# Patient Record
Sex: FEMALE | Race: BLACK OR AFRICAN AMERICAN | Employment: FULL TIME | URBAN - METROPOLITAN AREA
[De-identification: names, ages, dates, MRNs, and addresses within clinical notes are randomized per-mention and may not be internally consistent; named-entity substitution may affect disease eponyms.]

---

## 2019-05-25 ENCOUNTER — APPOINTMENT (OUTPATIENT)
Dept: GENERAL RADIOLOGY | Age: 49
End: 2019-05-25
Attending: PHYSICIAN ASSISTANT
Payer: COMMERCIAL

## 2019-05-25 ENCOUNTER — HOSPITAL ENCOUNTER (EMERGENCY)
Age: 49
Discharge: HOME | End: 2019-05-25
Attending: EMERGENCY MEDICINE
Payer: COMMERCIAL

## 2019-05-25 VITALS
HEIGHT: 65 IN | RESPIRATION RATE: 16 BRPM | HEART RATE: 74 BPM | SYSTOLIC BLOOD PRESSURE: 110 MMHG | TEMPERATURE: 98.5 F | WEIGHT: 175 LBS | OXYGEN SATURATION: 98 % | DIASTOLIC BLOOD PRESSURE: 70 MMHG | BODY MASS INDEX: 29.16 KG/M2

## 2019-05-25 DIAGNOSIS — S60.419A ABRASION OF FINGER OF LEFT HAND, INITIAL ENCOUNTER: ICD-10-CM

## 2019-05-25 DIAGNOSIS — S62.617A CLOSED DISPLACED FRACTURE OF PROXIMAL PHALANX OF LEFT LITTLE FINGER, INITIAL ENCOUNTER: Primary | ICD-10-CM

## 2019-05-25 PROCEDURE — 99283 EMERGENCY DEPT VISIT LOW MDM: CPT

## 2019-05-25 PROCEDURE — 74011000250 HC RX REV CODE- 250: Performed by: PHYSICIAN ASSISTANT

## 2019-05-25 PROCEDURE — 74011250637 HC RX REV CODE- 250/637: Performed by: PHYSICIAN ASSISTANT

## 2019-05-25 PROCEDURE — 73130 X-RAY EXAM OF HAND: CPT

## 2019-05-25 PROCEDURE — 75810000303 HC CLSD TRMT  FRACTURE/DISLOCATION W/  ANES

## 2019-05-25 RX ORDER — OXYCODONE AND ACETAMINOPHEN 5; 325 MG/1; MG/1
1 TABLET ORAL
Qty: 20 TAB | Refills: 0 | Status: SHIPPED | OUTPATIENT
Start: 2019-05-25 | End: 2019-06-01

## 2019-05-25 RX ORDER — BUPIVACAINE HYDROCHLORIDE 5 MG/ML
5 INJECTION, SOLUTION EPIDURAL; INTRACAUDAL
Status: COMPLETED | OUTPATIENT
Start: 2019-05-25 | End: 2019-05-25

## 2019-05-25 RX ORDER — OXYCODONE AND ACETAMINOPHEN 5; 325 MG/1; MG/1
1 TABLET ORAL
Status: COMPLETED | OUTPATIENT
Start: 2019-05-25 | End: 2019-05-25

## 2019-05-25 RX ORDER — OXYCODONE AND ACETAMINOPHEN 5; 325 MG/1; MG/1
1 TABLET ORAL
Qty: 12 TAB | Refills: 0 | Status: SHIPPED | OUTPATIENT
Start: 2019-05-25 | End: 2019-05-25

## 2019-05-25 RX ADMIN — OXYCODONE HYDROCHLORIDE AND ACETAMINOPHEN 1 TABLET: 5; 325 TABLET ORAL at 13:40

## 2019-05-25 RX ADMIN — OXYCODONE HYDROCHLORIDE AND ACETAMINOPHEN 1 TABLET: 5; 325 TABLET ORAL at 11:17

## 2019-05-25 RX ADMIN — BUPIVACAINE HYDROCHLORIDE 25 MG: 5 INJECTION, SOLUTION EPIDURAL; INTRACAUDAL; PERINEURAL at 11:17

## 2019-05-25 NOTE — ED TRIAGE NOTES
Pt arrives ambulatory to ED alone with c/o left 5th digit finger injury with pain and deformity after tripping over crack in sidewalk today.

## 2019-05-25 NOTE — ED PROVIDER NOTES
Marie Higgisn is a 54 y.o. Female, R hand dominant, who presents ambulatory to ER with c/o L 5th finger deformity and pain x fall PTA. Pt states she was walking on the sidewalk when her show got caught on a part of the sidewalk that was sticking up farther than the others causing her to trip and fall. States she fell and tried to protect her head by putting her hands out and states majority of her weight came down on her left hand causing acute onset pain and deformity to L 5th finger. Denies hitting her head. Also notes some abrasions to her L 2nd, 3rd, and 4th fingers. Td UTD. Denies any other complaints. Denies taking anything for sx. PCP: Gabriel Hunt MD   PMHx significant for: Past Medical History:  No date: Fluid retention      Comment:  Takes daily but initially prescribed for fluid retention  No date: Occipital neuralgia    PSHx significant for: History reviewed. No pertinent surgical history. -- Codeine -- Hives   -- Indomethacin -- Palpitations   -- Naproxen Sodium -- Other (comments)    --  \"feel bad all over\"   -- Sulfa (Sulfonamide Antibiotics) -- Unknown (comments)    There are no other complaints, changes or physical findings at this time. Past Medical History:   Diagnosis Date    Fluid retention     Takes daily but initially prescribed for fluid retention    Occipital neuralgia        History reviewed. No pertinent surgical history.       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     Employer: OTHER     Comment: [employer]   Social Needs    Financial resource strain: Not on file    Food insecurity:     Worry: Not on file     Inability: Not on file    Transportation needs:     Medical: Not on file     Non-medical: Not on file   Tobacco Use    Smoking status: Former Smoker     Packs/day: 0.25     Years: 5.00     Pack years: 1.25     Types: Cigarettes    Smokeless tobacco: Never Used   Substance and Sexual Activity    Alcohol use: No     Alcohol/week: 0.0 oz    Drug use: No    Sexual activity: Never   Lifestyle    Physical activity:     Days per week: Not on file     Minutes per session: Not on file    Stress: Not on file   Relationships    Social connections:     Talks on phone: Not on file     Gets together: Not on file     Attends Taoism service: Not on file     Active member of club or organization: Not on file     Attends meetings of clubs or organizations: Not on file     Relationship status: Not on file    Intimate partner violence:     Fear of current or ex partner: Not on file     Emotionally abused: Not on file     Physically abused: Not on file     Forced sexual activity: Not on file   Other Topics Concern    Not on file   Social History Narrative    Lives in [place], [place] - returns home every weekend. 11 yo son. ALLERGIES: Codeine; Indomethacin; Naproxen sodium; and Sulfa (sulfonamide antibiotics)    Review of Systems   Constitutional: Negative. HENT: Negative. Eyes: Negative. Respiratory: Negative. Cardiovascular: Negative. Gastrointestinal: Negative. Genitourinary: Negative. Musculoskeletal: Negative for back pain and neck pain. L 5th finger deformity and pain   Skin: Positive for wound. Multiple superficial abrasions to L hand   Neurological: Negative. Negative for dizziness, seizures, syncope and headaches. Hematological: Negative. Psychiatric/Behavioral: Negative. All other systems reviewed and are negative.       Vitals:    05/25/19 1036 05/25/19 1055   BP:  113/77   Pulse: 98 77   Resp:  16   Temp:  98.4 °F (36.9 °C)   SpO2: 98% 98% Weight:  79.4 kg (175 lb)   Height:  5' 5\" (1.651 m)            Physical Exam   Constitutional: She is oriented to person, place, and time. She appears well-developed and well-nourished. No distress. HENT:   Head: Normocephalic and atraumatic. Eyes: Conjunctivae are normal.   Neck: Normal range of motion. Cardiovascular: Intact distal pulses and normal pulses. Musculoskeletal: Normal range of motion. She exhibits no edema or tenderness. Deformity to L 5th finger at MCP joint with associated TTP and limited ROM all joints L 5th finger 2/2 pain and deformity. NVI distally, brisk cap refill. No further bony tenderness to L hand/fingers. FROM all remaining joints L hand and LUE without difficulty. NVI distally, brisk cap refill all digits. Ambulatory in ED without difficulty   Neurological: She is alert and oriented to person, place, and time. She has normal strength. No sensory deficit. Skin: Skin is warm and dry. No rash noted. She is not diaphoretic. No erythema. No pallor. approx 1cm superficial abrasion to L 3rd and 4th fingers to palmar aspect overlying PIP joints, bleeding controlled, no associated edema or underlying bony tenderness appreciable. approx 1/2cm linear superficial abrasion to L 2nd finger to palmar aspect overlying PIP joint. Bleeding controlled. No appreciably underlying bony tenderness. Psychiatric: She has a normal mood and affect. Her behavior is normal.   Nursing note and vitals reviewed. MDM  Number of Diagnoses or Management Options  Abrasion of finger of left hand, initial encounter:   Closed displaced fracture of proximal phalanx of left little finger, initial encounter:   Diagnosis management comments: Janice Shane is a 54 y.o. Female, R hand dominant, who presents ambulatory to ER with c/o L 5th finger deformity and pain x fall PTA. Pt with deformity to L 5th finger at MCP joint with associated limited ROM to all joints L 5th finger. NVI distally.  Xray shows acute fx of proximal 5th phalanx with angulation and displacement. No dislocation. Discussed with orthopedics who recommended reducing the fx as best as possible in ED and to have pt f/u next week with Dr. Юлия Dominguez next week as pt will need surgery. I blocked pts L 5th finger and reduced the fx best as possible and placed in ulnar gutter splint. Interval improvement in displacement, still significantly angulated. Cleansed abrasions. Discharged home with orthopedic f/u and pain medicine prn. Strict return precautions discussed. Gina Grigsby PA-C         Amount and/or Complexity of Data Reviewed  Tests in the radiology section of CPT®: ordered and reviewed  Discuss the patient with other providers: yes (Dr. Black Perry; Nancy St PA-C)    Patient Progress  Patient progress: stable         Procedures                 Procedure Note - Digital Block:   11:30 AM  Performed by: Gina Grigsby PA-C  Bupivacaine 0.25% without epinephrine used to perform digital block of Left small finger(s). The procedure took 1-15 minutes, and pt tolerated well. Procedure Note - Reduction:    11:40M  Performed by Gina Grigsby PA-C . Procedure start time: 11:45AM  Procedure end time: 12:00PM  Procedure was performed with a block. Medications provided as below:  Medications   oxyCODONE-acetaminophen (PERCOCET) 5-325 mg per tablet 1 Tab (has no administration in time range)   oxyCODONE-acetaminophen (PERCOCET) 5-325 mg per tablet 1 Tab (1 Tab Oral Given 5/25/19 1117)   bupivacaine (PF) (MARCAINE) 0.5 % (5 mg/mL) injection 25 mg (25 mg Peripheral Nerve Block Given by Provider 5/25/19 1117)       Immediately prior to the procedure, the patient was reevaluated and found suitable for the planned procedure and any planned medications. Immediately prior to the procedure a time out was called to verify the correct patient, procedure, equipment, staff, and marking as appropriate.     Prior to the procedure, neurovascular exam was intact. Analgesia was obtained with oral analgesics and digital nerve block. To achieve reduction of the patient's left 5th finger fx , longitudinal traction manipulation was utilized. The fx had improved alignment post reduction. Neurovascular exam was intact following the procedure. Post reduction x-ray ordered. The procedure took 1-15 minutes, and pt tolerated well. 1:38 PM  Pt has been reevaluated. There are no new complaints, changes, or physical findings at this time. Medications have been reviewed w/ pt and/or family. Pt and/or family's questions have been answered. Pt and/or family expressed good understanding of the dx/tx/rx and is in agreement with plan of care. Pt instructed and agreed to f/u w/ ortho and to return to ED upon further deterioration. Pt is ready for discharge. LABORATORY TESTS:  No results found for this or any previous visit (from the past 12 hour(s)). IMAGING RESULTS:  XR HAND LT MIN 3 V   Final Result   Status post closed reduction of left fifth finger proximal   phalangeal fracture with improved ulnar angulation, persistent dorsal angulation   and minimal displacement. .      XR HAND LT MIN 3 V   Final Result   Acute left small finger proximal phalangeal fracture with minimal   displacement and significant angulation. Mojgan Lockhart Hand Lt Min 3 V    Result Date: 5/25/2019  EXAM: XR HAND LT MIN 3 V INDICATION: post reduction fracture. COMPARISON: None. FINDINGS: Three views of the left hand demonstrate closed reduction of left small finger proximal phalangeal fracture with persistent volar angulation but significant improvement in ulnar angulation. There is persistent stable 2 mm radial displacement of the distal fracture fragment. . Soft tissue swelling is diffuse. .     Status post closed reduction of left fifth finger proximal phalangeal fracture with improved ulnar angulation, persistent dorsal angulation and minimal displacement. Christie Doshi Hand Lt Min 3 V    Result Date: 2019  EXAM: XR HAND LT MIN 3 V INDICATION: L hand pain, 5th finger dislocation. COMPARISON: None. FINDINGS: Three views of the left hand demonstrate left small finger proximal phalangeal fracture at the proximal metaphysis, with ulnar and dorsal angulation of the distal fracture fragments and approximately 2.4 mm radial displacement. . The soft tissues are within normal limits. Acute left small finger proximal phalangeal fracture with minimal displacement and significant angulation. Vijay Luo MEDICATIONS GIVEN:  Medications   oxyCODONE-acetaminophen (PERCOCET) 5-325 mg per tablet 1 Tab (has no administration in time range)   oxyCODONE-acetaminophen (PERCOCET) 5-325 mg per tablet 1 Tab (1 Tab Oral Given 19 1117)   bupivacaine (PF) (MARCAINE) 0.5 % (5 mg/mL) injection 25 mg (25 mg Peripheral Nerve Block Given by Provider 19 1117)       IMPRESSION:  1. Closed displaced fracture of proximal phalanx of left little finger, initial encounter    2. Abrasion of finger of left hand, initial encounter        PLAN:  1. Current Discharge Medication List      START taking these medications    Details   oxyCODONE-acetaminophen (PERCOCET) 5-325 mg per tablet Take 1 Tab by mouth every six (6) hours as needed for Pain for up to 7 days. Max Daily Amount: 4 Tabs. Qty: 12 Tab, Refills: 0    Associated Diagnoses: Closed displaced fracture of proximal phalanx of left little finger, initial encounter         CONTINUE these medications which have NOT CHANGED    Details   gabapentin (NEURONTIN) 300 mg capsule       DULoxetine (CYMBALTA) 30 mg capsule Take 1 Cap by mouth daily. Qty: 30 Cap, Refills: 3    Associated Diagnoses: Occipital neuralgia      hydrochlorothiazide (HYDRODIURIL) 25 mg tablet Take 25 mg by mouth as needed.          STOP taking these medications       traMADol (ULTRAM) 50 mg tablet Comments:   Reason for Stoppin.   Follow-up Information     Follow up With Specialties Details Why Contact Info    Abel Bojorquez MD Orthopedic Surgery Call in 3 days call and schedule next available follow up [address]  [phone]      [address] [facility] [facility] Emergency Medicine  If symptoms worsen [address] [place] [address]  [phone]          Return to ED if worse

## 2019-05-25 NOTE — DISCHARGE INSTRUCTIONS
We hope that we have addressed all of your medical concerns. The examination and treatment you received in the Emergency Department were for an emergent problem and were not intended as complete care. It is important that you follow up with your healthcare provider(s) for ongoing care. If your symptoms worsen or do not improve as expected, and you are unable to reach your usual health care provider(s), you should return to the Emergency Department. Today's healthcare is undergoing tremendous change, and patient satisfaction surveys are one of the many tools to assess the quality of medical care. You may receive a survey from the [employer] organization regarding your experience in the Emergency Department. I hope that your experience has been completely positive, particularly the medical care that I provided. As such, please participate in the survey; anything less than excellent does not meet my expectations or intentions. [facility] and [facility] participate in nationally recognized quality of care measures. If your blood pressure is greater than 120/80, as reported below, we urge that you seek medical care to address the potential of high blood pressure, commonly known as hypertension. Hypertension can be hereditary or can be caused by certain medical conditions, pain, stress, or \"white coat syndrome. \"       Please make an appointment with your health care provider(s) for follow up of your Emergency Department visit. VITALS:   Patient Vitals for the past 8 hrs:   Temp Pulse Resp BP SpO2   05/25/19 1055 98.4 °F (36.9 °C) 77 16 113/77 98 %   05/25/19 1036 -- 98 -- -- 98 %          Thank you for allowing us to provide you with medical care today. We realize that you have many choices for your emergency care needs. Please choose us in the future for any continued health care needs. [facility]: [phone]            No results found for this or any previous visit (from the past 24 hour(s)). Xr Hand Lt Min 3 V    Result Date: 5/25/2019  EXAM: XR HAND LT MIN 3 V INDICATION: post reduction fracture. COMPARISON: None. FINDINGS: Three views of the left hand demonstrate closed reduction of left small finger proximal phalangeal fracture with persistent volar angulation but significant improvement in ulnar angulation. There is persistent stable 2 mm radial displacement of the distal fracture fragment. . Soft tissue swelling is diffuse. .     Status post closed reduction of left fifth finger proximal phalangeal fracture with improved ulnar angulation, persistent dorsal angulation and minimal displacement. Armando Neigh Hand Lt Min 3 V    Result Date: 5/25/2019  EXAM: XR HAND LT MIN 3 V INDICATION: L hand pain, 5th finger dislocation. COMPARISON: None. FINDINGS: Three views of the left hand demonstrate left small finger proximal phalangeal fracture at the proximal metaphysis, with ulnar and dorsal angulation of the distal fracture fragments and approximately 2.4 mm radial displacement. . The soft tissues are within normal limits. Acute left small finger proximal phalangeal fracture with minimal displacement and significant angulation. .        Patient Education        Finger Fracture: Care Instructions  Your Care Instructions    Breaks in the bones of the finger usually heal well in about 3 to 4 weeks. The pain and swelling from a broken finger can last for weeks. But it should steadily improve, starting a few days after you break it. It is very important that you wear and take care of the cast or splint exactly as your doctor tells you to so that your finger heals properly and does not end up crooked. Wearing a splint may interfere with your normal activities. Ask for help with daily tasks if you need it. You heal best when you take good care of yourself.  Eat a variety of healthy foods, and don't smoke. Follow-up care is a key part of your treatment and safety. Be sure to make and go to all appointments, and call your doctor if you are having problems. It's also a good idea to know your test results and keep a list of the medicines you take. How can you care for yourself at home? · If your doctor put a splint on your finger, wear the splint exactly as directed. Do not remove it until your doctor says that you can. · Keep your hand raised above the level of your heart as much as you can. This will help reduce swelling. · Put ice or a cold pack on your finger for 10 to 20 minutes at a time. Try to do this every 1 to 2 hours for the next 3 days (when you are awake) or until the swelling goes down. Put a thin cloth between the ice and your skin. Keep the splint dry. · Be safe with medicines. Take pain medicines exactly as directed. ? If the doctor gave you a prescription medicine for pain, take it as prescribed. ? If you are not taking a prescription pain medicine, ask your doctor if you can take an over-the-counter medicine. When should you call for help? Call 911 anytime you think you may need emergency care. For example, call if:    · Your finger is cool or pale or changes color.    Call your doctor now or seek immediate medical care if:    · Your pain gets much worse.     · You have tingling, weakness, or numbness in your finger.     · You have signs of infection, such as:  ? Increased pain, swelling, warmth, or redness. ? Red streaks leading from the area. ? Pus draining from the area. ? Swollen lymph nodes in your neck, armpits, or groin. ? A fever.    Watch closely for changes in your health, and be sure to contact your doctor if:    · Your finger is not steadily improving. Where can you learn more? Go to http://calin-laura.info/. Enter Q071 in the search box to learn more about \"Finger Fracture: Care Instructions. \"  Current as of: September 20, 2018  Content Version: 11.9  © 4907-5489 [employer]. Care instructions adapted under license by [employer] (which disclaims liability or warranty for this information). If you have questions about a medical condition or this instruction, always ask your healthcare professional. [address] any warranty or liability for your use of this information.          Patient Education

## 2019-12-09 ENCOUNTER — HOSPITAL ENCOUNTER (EMERGENCY)
Age: 49
Discharge: HOME | End: 2019-12-09
Attending: EMERGENCY MEDICINE | Admitting: EMERGENCY MEDICINE
Payer: COMMERCIAL

## 2019-12-09 VITALS
TEMPERATURE: 98.5 F | HEIGHT: 65 IN | DIASTOLIC BLOOD PRESSURE: 80 MMHG | HEART RATE: 64 BPM | WEIGHT: 203.48 LBS | SYSTOLIC BLOOD PRESSURE: 133 MMHG | OXYGEN SATURATION: 99 % | BODY MASS INDEX: 33.9 KG/M2 | RESPIRATION RATE: 16 BRPM

## 2019-12-09 DIAGNOSIS — M54.81 OCCIPITAL NEURALGIA OF LEFT SIDE: Primary | ICD-10-CM

## 2019-12-09 PROCEDURE — 99282 EMERGENCY DEPT VISIT SF MDM: CPT

## 2019-12-09 RX ORDER — MULTIVIT WITH MINERALS/HERBS
1 TABLET ORAL DAILY
COMMUNITY

## 2019-12-09 RX ORDER — GABAPENTIN 300 MG/1
CAPSULE ORAL
Qty: 30 CAP | Refills: 1 | Status: SHIPPED | OUTPATIENT
Start: 2019-12-09 | End: 2021-06-05

## 2019-12-09 NOTE — DISCHARGE INSTRUCTIONS
Patient Education     Start the Gabapentin today for the nerve pain. Take 1 tablet the first day. Then take 1 tablet twice a day(every 12 hours)  the second day. Then take 1 tablet three times a day (every 8 hours) moving forward. Please call and arrange follow up with the neurologist.      Neuropathic Pain: Care Instructions  Your Care Instructions    Neuropathic pain is caused by pressure on or damage to your nerves. It's often simply called nerve pain. Some people feel this type of pain all the time. For others, it comes and goes. Diabetes, shingles, or an injury can cause nerve pain. Many people say the pain feels sharp, burning, or stabbing. But some people feel it as a dull ache. In some cases, it makes your skin very sensitive. So touch, pressure, and other sensations that did not hurt before may now cause pain. It's important to know that this kind of pain is real and can affect your quality of life. It's also important to know that treatment can help. Treatment includes pain medicines, exercise, and physical therapy. Medicines can help reduce the number of pain signals that travel over the nerves. This can make the painful areas less sensitive. It can also help you sleep better and improve your mood. But medicines are only one part of successful treatment. Most people do best with more than one kind of treatment. Your doctor may recommend that you try cognitive-behavioral therapy and stress management. Or, if needed, you may decide to try to quit smoking, lower your blood pressure, or better control blood sugar. These kinds of healthy changes can also make a difference. If you feel that your treatment is not working, talk to your doctor. And be sure to tell your doctor if you think you might be depressed or anxious. These are common problems that can also be treated. Follow-up care is a key part of your treatment and safety.  Be sure to make and go to all appointments, and call your doctor if you are having problems. It's also a good idea to know your test results and keep a list of the medicines you take. How can you care for yourself at home? · Be safe with medicines. Read and follow all instructions on the label. ? If the doctor gave you a prescription medicine for pain, take it as prescribed. ? If you are not taking a prescription pain medicine, ask your doctor if you can take an over-the-counter medicine. · Save hard tasks for days when you have less pain. Follow a hard task with an easy task. And remember to take breaks. · Relax, and reduce stress. You may want to try deep breathing or meditation. These can help. · Keep moving. Gentle, daily exercise can help reduce pain. Your doctor or physical therapist can tell you what type of exercise is best for you. This may include walking, swimming, and stationary biking. It may also include stretches and range-of-motion exercises. · Try heat, cold packs, and massage. · Get enough sleep. Constant pain can make you more tired. If the pain makes it hard to sleep, talk with your doctor. · Think positively. Your thoughts can affect your pain. Do fun things to distract yourself from the pain. See a movie, read a book, listen to music, or spend time with a friend. · Keep a pain diary. Try to write down how strong your pain is and what it feels like. Also try to notice and write down how your moods, thoughts, sleep, activities, and medicine affect your pain. These notes can help you and your doctor find the best ways to treat your pain. Reducing constipation caused by pain medicine  Pain medicines often cause constipation. To reduce constipation:  · Include fruits, vegetables, beans, and whole grains in your diet each day. These foods are high in fiber. · Drink plenty of fluids, enough so that your urine is light yellow or clear like water.  If you have kidney, heart, or liver disease and have to limit fluids, talk with your doctor before you increase the amount of fluids you drink. · Get some exercise every day. Build up slowly to 30 to 60 minutes a day on 5 or more days of the week. · Take a fiber supplement, such as Citrucel or Metamucil, every day if needed. Read and follow all instructions on the label. · Schedule time each day for a bowel movement. Having a daily routine may help. Take your time and do not strain when having a bowel movement. · Ask your doctor about a laxative. The goal is to have one easy bowel movement every 1 to 2 days. Do not let constipation go untreated for more than 3 days. When should you call for help? Call your doctor now or seek immediate medical care if:    · You feel sad, anxious, or hopeless for more than a few days. This could mean you are depressed. Depression is common in people who have a lot of pain. But it can be treated.     · You have trouble with bowel movements, such as:  ? No bowel movement in 3 days. ? Blood in the anal area, in your stool, or on the toilet paper. ? Diarrhea for more than 24 hours.    Watch closely for changes in your health, and be sure to contact your doctor if:    · Your pain is getting worse.     · You can't sleep because of pain.     · You are very worried or anxious about your pain.     · You have trouble taking your pain medicine.     · You have any concerns about your pain medicine or its side effects.     · You have vomiting or cramps for more than 2 hours. Where can you learn more? Go to http://calin-laura.info/. Enter M502 in the search box to learn more about \"Neuropathic Pain: Care Instructions. \"  Current as of: March 28, 2019  Content Version: 12.2  © 5446-1458 [employer]. Care instructions adapted under license by [employer] (which disclaims liability or warranty for this information).  If you have questions about a medical condition or this instruction, always ask your healthcare professional. [employer] disclaims any warranty or liability for your use of this information.

## 2019-12-09 NOTE — ED NOTES
Pt discharged home with prescription, discharge and follow-up instructions, and a work excuse for today.  Pt verbalized understanding of all directions and was ambulatory with a steady and purposeful gait at discharge

## 2019-12-09 NOTE — LETTER
Amy Patel 57 
[address] [facility] [id number] [phone] Work/School Note Date: 12/9/2019 To Whom It May concern: 
 
Cody Son was seen and treated today in the emergency room by the following provider(s): 
Attending Provider: Kevin Tejada MD.   
 
Cody Bergman may return to work on 12/10/2019.  
 
Sincerely, 
 
 
 
 
Gabriella Ponce RN

## 2019-12-09 NOTE — ED PROVIDER NOTES
HPI     57-year-old female with a history of occipital neuralgia presents the ED complaining of left head facial and shoulder burning. She states her symptoms are identical to her prior episodes of occipital neuralgia. She is no longer taking any medications for it. Symptoms onset over the weekend. She denies any trauma. She has no fever, URI, sinus symptoms. She has no weakness or numbness. She states a neurologist once prescribed gabapentin for her but she did not try but feels that may be beneficial at this time. She has no known chest pain or shortness of breath no abdominal pain nausea vomiting or diarrhea. No urinary symptoms. Past Medical History:   Diagnosis Date    Fluid retention     Takes daily but initially prescribed for fluid retention    Occipital neuralgia        History reviewed. No pertinent surgical history.       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     Employer: OTHER     Comment: In [employer] resource strain: Not on file    Food insecurity:     Worry: Not on file     Inability: Not on file    Transportation needs:     Medical: Not on file     Non-medical: Not on file   Tobacco Use    Smoking status: Former Smoker     Packs/day: 0.25     Years: 5.00     Pack years: 1.25     Types: Cigarettes    Smokeless tobacco: Never Used   Substance and Sexual Activity    Alcohol use: No     Alcohol/week: 0.0 standard drinks    Drug use: No    Sexual activity: Never   Lifestyle    Physical activity:     Days per week: Not on file     Minutes per session: Not on file    Stress: Not on file   Relationships  Social connections:     Talks on phone: Not on file     Gets together: Not on file     Attends Baptism service: Not on file     Active member of club or organization: Not on file     Attends meetings of clubs or organizations: Not on file     Relationship status: Not on file    Intimate partner violence:     Fear of current or ex partner: Not on file     Emotionally abused: Not on file     Physically abused: Not on file     Forced sexual activity: Not on file   Other Topics Concern    Not on file   Social History Narrative    Lives in [place], [place] - returns home every weekend. 13 yo son. ALLERGIES: Codeine; Indomethacin; Naproxen sodium; and Sulfa (sulfonamide antibiotics)    Review of Systems   Constitutional: Negative for fever. HENT: Negative for congestion. Eyes: Negative for visual disturbance. Respiratory: Negative for cough and shortness of breath. Cardiovascular: Negative for chest pain. Gastrointestinal: Negative for abdominal pain, nausea and vomiting. Genitourinary: Negative for dysuria. Musculoskeletal: Negative for gait problem. Skin: Negative for rash. Neurological: Negative for headaches. Psychiatric/Behavioral: Negative for dysphoric mood. Vitals:    12/09/19 0359   BP: 133/80   Pulse: 64   Resp: 16   Temp: 98.5 °F (36.9 °C)   SpO2: 99%   Weight: 92.3 kg (203 lb 7.8 oz)   Height: 5' 5\" (1.651 m)            Physical Exam  Constitutional:       General: She is not in acute distress. Appearance: She is well-developed. HENT:      Head: Normocephalic and atraumatic. Mouth/Throat:      Pharynx: No oropharyngeal exudate. Eyes:      General: No scleral icterus. Right eye: No discharge. Left eye: No discharge. Pupils: Pupils are equal, round, and reactive to light. Neck:      Musculoskeletal: Normal range of motion and neck supple. Vascular: No JVD.    Cardiovascular:      Rate and Rhythm: Normal rate and regular rhythm. Heart sounds: Normal heart sounds. No murmur. Pulmonary:      Effort: Pulmonary effort is normal. No respiratory distress. Breath sounds: Normal breath sounds. No stridor. No wheezing or rales. Chest:      Chest wall: No tenderness. Abdominal:      General: Bowel sounds are normal. There is no distension. Palpations: Abdomen is soft. There is no mass. Tenderness: There is no tenderness. There is no guarding or rebound. Musculoskeletal: Normal range of motion. Skin:     General: Skin is warm and dry. Capillary Refill: Capillary refill takes less than 2 seconds. Findings: No rash. Neurological:      Mental Status: She is oriented to person, place, and time. Psychiatric:         Behavior: Behavior normal.         Thought Content: Thought content normal.         Judgment: Judgment normal.          MDM       Procedures      Normal neuro exam.  Symptoms c/w prior occipital neuralgia. Will restart gabapentin. Follow up with neurology.  Return if worsneing

## 2019-12-09 NOTE — ED TRIAGE NOTES
Ambulatory from home with c/o burning sensation L side of head, neck and face. States had occipital neuralgia years ago and this feels the same,.

## 2020-01-02 ENCOUNTER — OFFICE VISIT (OUTPATIENT)
Dept: FAMILY MEDICINE CLINIC | Age: 50
End: 2020-01-02

## 2020-01-02 VITALS
HEART RATE: 70 BPM | RESPIRATION RATE: 18 BRPM | SYSTOLIC BLOOD PRESSURE: 122 MMHG | OXYGEN SATURATION: 98 % | DIASTOLIC BLOOD PRESSURE: 84 MMHG | BODY MASS INDEX: 34.29 KG/M2 | HEIGHT: 65 IN | TEMPERATURE: 98.5 F | WEIGHT: 205.8 LBS

## 2020-01-02 DIAGNOSIS — Z12.11 SCREENING FOR COLON CANCER: ICD-10-CM

## 2020-01-02 DIAGNOSIS — R53.82 CHRONIC FATIGUE: ICD-10-CM

## 2020-01-02 DIAGNOSIS — Z13.220 SCREENING FOR HYPERLIPIDEMIA: ICD-10-CM

## 2020-01-02 DIAGNOSIS — R60.9 FLUID RETENTION: ICD-10-CM

## 2020-01-02 DIAGNOSIS — M54.81 OCCIPITAL NEURALGIA OF LEFT SIDE: Primary | ICD-10-CM

## 2020-01-02 DIAGNOSIS — R22.9 SKIN MASS: ICD-10-CM

## 2020-01-02 DIAGNOSIS — E07.9 THYROID MASS: ICD-10-CM

## 2020-01-02 RX ORDER — HYDROCHLOROTHIAZIDE 25 MG/1
25 TABLET ORAL DAILY
Qty: 90 TAB | Refills: 1 | Status: SHIPPED | OUTPATIENT
Start: 2020-01-02 | End: 2021-03-02 | Stop reason: SDUPTHER

## 2020-01-02 NOTE — PROGRESS NOTES
Abad Becker  54 y.o. female  1970  [address]  [address]  [id number]     [address] [facility]       Encounter Date: 1/2/2020           New Patient Visit Note: Ryan Santana MD      Reason for Appointment:  Chief Complaint   Patient presents with    New Patient     Patient here to establish care with provider.  Other     Patient states that she has had a burning sensation on top of head with some low injury        History of Present Illness:  History provided by patient    Abad Becker is a 54 y.o. female who presents to clinic today for:    Occipital neuralgia  Duration; 2015  Symtpoms: pain in top of head radiating down to left arm  Last Episode: went to ER; was given Gabapentin, which she reports as helpful  Spinal Doctor: had cortisone injection x 1 at [facility]  MRI: reports she had this which was normal    Thryoid Nodule  Reports having thyroid nodule   Biopsy: (2015): benign colloid cyst  TSH: no recent TSH    Colonoscopy  Patient reports that she would like screening for colonsocpy. - denies any personal or family history of colon cancer    Skin Mass  Duration: 2010  Left Upper shoulder  - reports that she was told that it was a lipoma    Fluid Retention  Duration: 2005  Symptoms: reports that symtpoms started after having her first child        Review of Systems  Fatigue, weight gain, All other ROS were reviewed and are negative except as discussed in HPI        Allergies: Codeine; Indomethacin; Naproxen sodium; Sulfa (sulfonamide antibiotics); and Tramadol    Medications: (Updated to reflect final medication list after visit)    Current Outpatient Medications:     hydroCHLOROthiazide (HYDRODIURIL) 25 mg tablet, Take 1 Tab by mouth daily. , Disp: 90 Tab, Rfl: 1    b complex vitamins tablet, Take 1 Tab by mouth daily. , Disp: , Rfl:     gabapentin (NEURONTIN) 300 mg capsule, 1 tablet a day for 1 day, then 1 tablet twice a day for 1 day, then 1 tablet three times a day, Disp: 30 Cap, Rfl: 1    History  Patient Care Team:  Unknown, Provider as PCP - General  Yisroel Denver, Colbert Dakin, MD (Obstetrics & Gynecology)  Lizet Allen MD (Otolaryngology)  Edna Lynch MD as Consulting Provider (Endocrinology)    Past Medical History: she has a past medical history of Fluid retention and Occipital neuralgia. Past Surgical History: she has a past surgical history that includes ir fine needle aspiration thyroid and hx partial hysterectomy. Family Medical History: family history includes Hypertension in her mother; Migraines in her father; Seizures in her father. Social History: she reports that she has quit smoking. Her smoking use included cigarettes. She has a 1.25 pack-year smoking history. She has never used smokeless tobacco. She reports that she does not drink alcohol or use drugs. Occupation: works as a teacher, Home: lives with mother      Objective:   Visit Vitals  /84 (BP 1 Location: Right arm, BP Patient Position: Sitting)   Pulse 70   Temp 98.5 °F (36.9 °C) (Oral)   Resp 18   Ht 5' 5\" (1.651 m)   Wt 205 lb 12.8 oz (93.4 kg)   LMP 01/20/2016   SpO2 98%   BMI 34.25 kg/m²     Wt Readings from Last 3 Encounters:   01/02/20 205 lb 12.8 oz (93.4 kg)   12/09/19 203 lb 7.8 oz (92.3 kg)   05/25/19 175 lb (79.4 kg)       Physical Exam  Vitals signs reviewed. HENT:      Head: Normocephalic and atraumatic. Right Ear: Hearing normal. No drainage. No middle ear effusion. There is no impacted cerumen. Tympanic membrane is not injected or erythematous. Left Ear: Hearing normal. No drainage. No middle ear effusion. There is no impacted cerumen. Tympanic membrane is not injected or erythematous. Nose: Nose normal. No nasal deformity or septal deviation. Mouth/Throat:      Lips: Pink. No lesions. Mouth: Mucous membranes are moist.      Palate: No mass. Pharynx: Oropharynx is clear. Uvula midline.  No pharyngeal swelling, oropharyngeal exudate or posterior oropharyngeal erythema. Tonsils: No tonsillar exudate. Eyes:      General: Lids are normal. Vision grossly intact. Gaze aligned appropriately. Extraocular Movements: Extraocular movements intact. Conjunctiva/sclera:      Right eye: Right conjunctiva is not injected. Left eye: Left conjunctiva is not injected. Pupils: Pupils are equal, round, and reactive to light. Neck:      Musculoskeletal: Normal range of motion and neck supple. No neck rigidity or muscular tenderness. Vascular: No carotid bruit. Cardiovascular:      Rate and Rhythm: Normal rate and regular rhythm. Heart sounds: No murmur. No friction rub. No gallop. Pulmonary:      Effort: No respiratory distress. Breath sounds: No wheezing, rhonchi or rales. Abdominal:      General: Bowel sounds are normal. There is no distension or abdominal bruit. Tenderness: There is no tenderness. Musculoskeletal: Normal range of motion. Cervical back: Normal.   Lymphadenopathy:      Cervical: No cervical adenopathy. Skin:         Neurological:      General: No focal deficit present. Motor: Motor function is intact. Coordination: Coordination is intact. Gait: Gait is intact. Deep Tendon Reflexes:      Reflex Scores:       Patellar reflexes are 2+ on the right side and 2+ on the left side. Psychiatric:         Attention and Perception: Attention and perception normal.         Mood and Affect: Mood and affect normal.         Speech: Speech normal.         Cognition and Memory: Cognition and memory normal.         Assessment & Plan:    Diagnoses and all orders for this visit:    1. Occipital neuralgia of left side: Chronic; Will provide referral to pain management for further evaluation.   -     REFERRAL TO PAIN MANAGEMENT    2. Thyroid mass: Chronic, pathology in 2015 c/w benign colloid cyst; will check labs and recheck imaing  -     US THYROID/PARATHYROID/SOFT TISS; Future    3. Skin mass: Chronic, H&P c/w lipoma vs epidermal inclusion cyst  -     REFERRAL TO DERMATOLOGY    4. Chronic fatigue: Chronic, will check labs  -     TSH 3RD GENERATION  -     CBC WITH AUTOMATED DIFF  -     VITAMIN D, 25 HYDROXY    5. Fluid retention: Chronic, with no significant swelling on exam today; Continue HCTZ  -     hydroCHLOROthiazide (HYDRODIURIL) 25 mg tablet; Take 1 Tab by mouth daily. 6. Screening for hyperlipidemia  -     METABOLIC PANEL, COMPREHENSIVE  -     LIPID PANEL    7. Screening for colon cancer  -     COLOGUARD TEST (FECAL DNA COLORECTAL CANCER SCREENING)        I have discussed the diagnosis with the patient and the intended plan as seen in the above orders. The patient has received an after-visit summary along with patient information handout. I have discussed medication side effects and warnings with the patient as well. Dispostion  Follow-up and Dispositions    · Return in about 4 weeks (around 1/30/2020).            Sarah Velasco MD

## 2020-01-02 NOTE — PATIENT INSTRUCTIONS
Colon Cancer Screening: Care Instructions  Your Care Instructions    Colorectal cancer occurs in the colon or rectum. That's the lower part of your digestive system. It is the second-leading cause of cancer deaths in the [place]. It often starts with small growths called polyps in the colon or rectum. Polyps are usually found with screening tests. Depending on the type of test, any polyps found may be removed during the tests. Colorectal cancer usually does not cause symptoms at first. But regular tests can help find it early, before it spreads and becomes harder to treat. Your risk for colorectal cancer gets higher as you get older. Some experts say that adults should start regular screening at age 48 and stop at age 76. Others say to start before age 48 or continue after age 76. Talk with your doctor about your risk and when to start and stop screening. You may have one of several tests. Follow-up care is a key part of your treatment and safety. Be sure to make and go to all appointments, and call your doctor if you are having problems. It's also a good idea to know your test results and keep a list of the medicines you take. What are the main screening tests for colon cancer? · Stool tests. These include the fecal immunochemical test (FIT) and the fecal occult blood test (FOBT). These tests check stool samples for signs of cancer. If your test is positive, you will need to have a colonoscopy. · Sigmoidoscopy. This test lets your doctor look at the lining of your rectum and the lowest part of your colon. Your doctor uses a lighted tube called a sigmoidoscope. This test can't find cancers or polyps in the upper part of your colon. In some cases, polyps that are found can be removed. But if your doctor finds polyps, you will need to have a colonoscopy to check the upper part of your colon. · Colonoscopy. This test lets your doctor look at the lining of your rectum and your entire colon.  The doctor uses a thin, flexible tool called a colonoscope. It can also be used to remove polyps or get a tissue sample (biopsy). What tests do you need? The following guidelines are for adults who are not at high risk for colorectal cancer. You may have at least one of these tests as directed by your doctor. · Fecal immunochemical test (FIT) or guaiac fecal occult blood test (gFOBT) every year  · Sigmoidoscopy every 5 years  · Colonoscopy every 10 years  If you are over age 76, you can work with your doctor to decide if screening is a good option. Talk with your doctor about when you need to be tested. And discuss which tests are right for you. Your doctor may recommend earlier or more frequent testing if you:  · Have had colorectal cancer before. · Have had colon polyps. · Have symptoms of colorectal cancer. These include blood in your stool and changes in your bowel habits. · Have a parent, brother or sister, or child with colon polyps or colorectal cancer. · Have a bowel disease. This includes ulcerative colitis and Crohn's disease. · Have a rare polyp syndrome that runs in families, such as familial adenomatous polyposis (FAP). · Have had radiation treatments to the belly or pelvis. When should you call for help? Watch closely for changes in your health, and be sure to contact your doctor if:    · You have any changes in your bowel habits.     · You have any problems. Where can you learn more? Go to http://calin-laura.info/. Enter M541 in the search box to learn more about \"Colon Cancer Screening: Care Instructions. \"  Current as of: December 19, 2018  Content Version: 12.2  © 6154-4456 [employer]. Care instructions adapted under license by [employer] (which disclaims liability or warranty for this information).  If you have questions about a medical condition or this instruction, always ask your healthcare professional. [place] [address] any warranty or liability for your use of this information. Fatigue: Care Instructions  Your Care Instructions    Fatigue is a feeling of tiredness, exhaustion, or lack of energy. You may feel fatigue because of too much or not enough activity. It can also come from stress, lack of sleep, boredom, and poor diet. Many medical problems, such as viral infections, can cause fatigue. Emotional problems, especially depression, are often the cause of fatigue. Fatigue is most often a symptom of another problem. Treatment for fatigue depends on the cause. For example, if you have fatigue because you have a certain health problem, treating this problem also treats your fatigue. If depression or anxiety is the cause, treatment may help. Follow-up care is a key part of your treatment and safety. Be sure to make and go to all appointments, and call your doctor if you are having problems. It's also a good idea to know your test results and keep a list of the medicines you take. How can you care for yourself at home? · Get regular exercise. But don't overdo it. Go back and forth between rest and exercise. · Get plenty of rest.  · Eat a healthy diet. Do not skip meals, especially breakfast.  · Reduce your use of caffeine, tobacco, and alcohol. Caffeine is most often found in coffee, tea, cola drinks, and chocolate. · Limit medicines that can cause fatigue. This includes tranquilizers and cold and allergy medicines. When should you call for help? Watch closely for changes in your health, and be sure to contact your doctor if:    · You have new symptoms such as fever or a rash.     · Your fatigue gets worse.     · You have been feeling down, depressed, or hopeless. Or you may have lost interest in things that you usually enjoy.     · You are not getting better as expected. Where can you learn more? Go to http://calin-laura.info/.   Enter X327 in the search box to learn more about \"Fatigue: Care Instructions. \"  Current as of: June 26, 2019  Content Version: 12.2  © 0473-2513 [employer]. Care instructions adapted under license by [employer] (which disclaims liability or warranty for this information). If you have questions about a medical condition or this instruction, always ask your healthcare professional. [place] [address] any warranty or liability for your use of this information.

## 2020-01-03 ENCOUNTER — TELEPHONE (OUTPATIENT)
Dept: FAMILY MEDICINE CLINIC | Age: 50
End: 2020-01-03

## 2020-01-03 LAB
25(OH)D3+25(OH)D2 SERPL-MCNC: 15.7 NG/ML (ref 30–100)
ALBUMIN SERPL-MCNC: 4.7 G/DL (ref 3.5–5.5)
ALBUMIN/GLOB SERPL: 1.8 {RATIO} (ref 1.2–2.2)
ALP SERPL-CCNC: 55 IU/L (ref 39–117)
ALT SERPL-CCNC: 20 IU/L (ref 0–32)
AST SERPL-CCNC: 28 IU/L (ref 0–40)
BASOPHILS # BLD AUTO: 0.1 X10E3/UL (ref 0–0.2)
BASOPHILS NFR BLD AUTO: 1 %
BILIRUB SERPL-MCNC: 0.5 MG/DL (ref 0–1.2)
BUN SERPL-MCNC: 17 MG/DL (ref 6–24)
BUN/CREAT SERPL: 18 (ref 9–23)
CALCIUM SERPL-MCNC: 10.1 MG/DL (ref 8.7–10.2)
CHLORIDE SERPL-SCNC: 103 MMOL/L (ref 96–106)
CHOLEST SERPL-MCNC: 215 MG/DL (ref 100–199)
CO2 SERPL-SCNC: 21 MMOL/L (ref 20–29)
CREAT SERPL-MCNC: 0.92 MG/DL (ref 0.57–1)
EOSINOPHIL # BLD AUTO: 0.2 X10E3/UL (ref 0–0.4)
EOSINOPHIL NFR BLD AUTO: 4 %
ERYTHROCYTE [DISTWIDTH] IN BLOOD BY AUTOMATED COUNT: 14.3 % (ref 12.3–15.4)
GLOBULIN SER CALC-MCNC: 2.6 G/DL (ref 1.5–4.5)
GLUCOSE SERPL-MCNC: 87 MG/DL (ref 65–99)
HCT VFR BLD AUTO: 44.3 % (ref 34–46.6)
HDLC SERPL-MCNC: 80 MG/DL
HGB BLD-MCNC: 14.6 G/DL (ref 11.1–15.9)
IMM GRANULOCYTES # BLD AUTO: 0 X10E3/UL (ref 0–0.1)
IMM GRANULOCYTES NFR BLD AUTO: 0 %
INTERPRETATION, 910389: NORMAL
LDLC SERPL CALC-MCNC: 124 MG/DL (ref 0–99)
LYMPHOCYTES # BLD AUTO: 1.7 X10E3/UL (ref 0.7–3.1)
LYMPHOCYTES NFR BLD AUTO: 32 %
MCH RBC QN AUTO: 26.9 PG (ref 26.6–33)
MCHC RBC AUTO-ENTMCNC: 33 G/DL (ref 31.5–35.7)
MCV RBC AUTO: 82 FL (ref 79–97)
MONOCYTES # BLD AUTO: 0.5 X10E3/UL (ref 0.1–0.9)
MONOCYTES NFR BLD AUTO: 9 %
NEUTROPHILS # BLD AUTO: 2.9 X10E3/UL (ref 1.4–7)
NEUTROPHILS NFR BLD AUTO: 54 %
PLATELET # BLD AUTO: 221 X10E3/UL (ref 150–450)
POTASSIUM SERPL-SCNC: 4.6 MMOL/L (ref 3.5–5.2)
PROT SERPL-MCNC: 7.3 G/DL (ref 6–8.5)
RBC # BLD AUTO: 5.42 X10E6/UL (ref 3.77–5.28)
SODIUM SERPL-SCNC: 145 MMOL/L (ref 134–144)
TRIGL SERPL-MCNC: 55 MG/DL (ref 0–149)
TSH SERPL DL<=0.005 MIU/L-ACNC: 0.52 UIU/ML (ref 0.45–4.5)
VLDLC SERPL CALC-MCNC: 11 MG/DL (ref 5–40)
WBC # BLD AUTO: 5.4 X10E3/UL (ref 3.4–10.8)

## 2020-01-03 NOTE — TELEPHONE ENCOUNTER
01/03/20  8:11 AM    Patient reports that she has apt today at 1:30 PM with Dr. Luis Valles. She reports that she is feeling better today. Discussed reasons to call or go to ED. Patient is in agreement. Bindu Edwards MD      ----- Message from Justen Scales sent at 1/2/2020  5:22 PM EST -----  Regarding: Dr. Luis Herman / Felipe Aguirre first and last name: Christophe Choe - Mother  Reason for call: Pt is experiencing severe migraines as part of an ongoing chronic condition but the pt is also experiencing nausea and would like to know if there is anything she could take that might alleviate the pain?   Callback required yes/no and why: Yes  Best contact number(s): [phone]  Details to clarify the request:

## 2020-01-07 ENCOUNTER — TELEPHONE (OUTPATIENT)
Dept: FAMILY MEDICINE CLINIC | Age: 50
End: 2020-01-07

## 2020-01-07 NOTE — TELEPHONE ENCOUNTER
01/07/20  8:22 AM    Attempted to call patient to discuss lab results. No answer. Left VM.     Fletcher Pakr MD

## 2020-01-07 NOTE — PROGRESS NOTES
01/07/20  5:40 PM    Attempted to call patient to discuss lab results. No answer.  Left VM    Krupa Yeung MD

## 2020-01-13 NOTE — PROGRESS NOTES
01/13/20  6:22 PM    Attempted to call patient to discuss lab results. No answer.      Quita Carballo MD

## 2020-01-20 ENCOUNTER — HOSPITAL ENCOUNTER (OUTPATIENT)
Dept: LAB | Age: 50
Discharge: HOME | End: 2020-01-20

## 2020-01-20 ENCOUNTER — OFFICE VISIT (OUTPATIENT)
Dept: DERMATOLOGY | Facility: AMBULATORY SURGERY CENTER | Age: 50
End: 2020-01-20

## 2020-01-20 VITALS
SYSTOLIC BLOOD PRESSURE: 116 MMHG | BODY MASS INDEX: 34.16 KG/M2 | RESPIRATION RATE: 14 BRPM | OXYGEN SATURATION: 100 % | WEIGHT: 205 LBS | HEART RATE: 82 BPM | DIASTOLIC BLOOD PRESSURE: 82 MMHG | HEIGHT: 65 IN

## 2020-01-20 VITALS
OXYGEN SATURATION: 100 % | HEIGHT: 65 IN | DIASTOLIC BLOOD PRESSURE: 82 MMHG | SYSTOLIC BLOOD PRESSURE: 116 MMHG | RESPIRATION RATE: 14 BRPM | WEIGHT: 205 LBS | HEART RATE: 82 BPM | BODY MASS INDEX: 34.16 KG/M2

## 2020-01-20 DIAGNOSIS — D17.79 LIPOMA OF OTHER SPECIFIED SITES: Primary | ICD-10-CM

## 2020-01-20 DIAGNOSIS — D17.1 LIPOMA OF BACK: Primary | ICD-10-CM

## 2020-01-20 NOTE — PROGRESS NOTES
Amanda Sevilla is a 54 y.o. female presents to the office today with the following:  Chief Complaint   Patient presents with    Surgery     excision on back        62-year-old -American female presents for surgical excision of a likely lipoma on the left upper back that is been present for several years. She reports that it fluctuates in size that she gains and loses weight. It is frequently irritating but is otherwise not painful or itchy in particular. She has been diagnosed with occipital neuralgia that causes shooting pains from her neck down her arm and she wonders if the lipoma has anything to do with it. She is otherwise in her normal state of good health and has no additional skin complaints today. Physical Exam  HENT:      Head: Normocephalic. Pulmonary:      Effort: Pulmonary effort is normal.   Skin:     Comments: On the left upper back there is a broad soft tumor that moves slightly as the patient raises her left arm. Neurological:      Mental Status: She is alert and oriented to person, place, and time. 1. Lipoma of back  Excision was advised for removal and therapy of this 8 cm likely lipoma on the left upper back. The excision procedure was reviewed and verbal and written consents were obtained. The site was identified and confirmed with the patient. The risks of pain, bleeding, infection, recurrence of the lesion, and scar were discussed. I performed the procedure. The site was cleansed and anesthetized with 1% lidocaine with epinephrine 1:100,000. An incision was made measuring 5 cm through subcutaneous fat until the fibrous capsule of the lipoma was encountered. Sharp and blunt dissection was used to separate the borders of the lipoma from the surrounding subcutaneous tissue as well as from the underlying fascia. Hemostasis was achieved using electrosurgery.   A intermediate primary closure was performed after the excision using 4-0 Maxon buried vertical mattress sutures and 4-0 prolene epidermal sutures. The closure length was 5.0 cm. The wound was bandaged and care reviewed. The specimen was sent to pathology. I will contact the patient with the results and any further treatment that may be necessary.            - SURGICAL PATHOLOGY; Future      Follow-up and Dispositions    · Return in about 2 weeks (around 2/3/2020) for suture removal upper back .          Essie Hagen MD

## 2020-01-20 NOTE — PROGRESS NOTES
Written by Lilia Ivy, as dictated by Bharati Sandoval, [address]. Name: Juan Jose Marshall       Age: 54 y.o. Date: 1/20/2020    Chief Complaint:   Chief Complaint   Patient presents with    Skin Exam     spot on back        Subjective:    HPI:  Ms.. Juan Jose Marshall is a 54 y.o. female who presents for the evaluation of a bothersome, growing growing \"knot-like\" lesion on the left back that has existed for many years. The patient was referred by Dr. Samanta Teran for this evaluation. The patient reports being diagnosed with occipital neuralgia in 2014 and managing symptoms with steroid shots. She reports becoming more concerned about the lesion as it grew overtime and seeing multiple doctors for evaluation. She says that she wondered if it could be pressing down on her nerves and related to her nerve pain. Per pt, she was told it was a lipoma and that it was unlikely related to her pain. She would like a second opinion and denies having the lesion since birth. She is feeling well and in her usual state of health today. She has no current illnesses, no other skin concerns. Her allergies, medications, medical, and social history are reviewed by me today.     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     Employer: OTHER     Comment: In Located within [facility] [address] resource strain: Not on file    Food insecurity:     Worry: Not on file     Inability: Not on file    Transportation needs:     Medical: Not on file     Non-medical: Not on file   Tobacco Use    Smoking status: Former Smoker     Packs/day: 0.25     Years: 5.00     Pack years: 1.25     Types: Cigarettes    Smokeless tobacco: Never Used   Substance and Sexual Activity    Alcohol use: No     Alcohol/week: 0.0 standard drinks    Drug use: No    Sexual activity: Never   Lifestyle    Physical activity:     Days per week: Not on file     Minutes per session: Not on file    Stress: Not on file   Relationships    Social connections:     Talks on phone: Not on file     Gets together: Not on file     Attends Ep[employer]alian service: Not on file     Active member of club or organization: Not on file     Attends meetings of clubs or organizations: Not on file     Relationship status: Not on file    Intimate partner violence:     Fear of current or ex partner: Not on file     Emotionally abused: Not on file     Physically abused: Not on file     Forced sexual activity: Not on file   Other Topics Concern    Not on file   Social History Narrative    Lives in [place], [place] - returns home every weekend. 11 yo son. Family History   Problem Relation Age of Onset    Hypertension Mother     Seizures Father    Rubalcava Migraines Father        Past Medical History:   Diagnosis Date    Fluid retention     Takes daily but initially prescribed for fluid retention    Occipital neuralgia        Past Surgical History:   Procedure Laterality Date    HX PARTIAL HYSTERECTOMY      IR FINE NEEDLE ASPIRATION THYROID         Current Outpatient Medications   Medication Sig Dispense Refill    hydroCHLOROthiazide (HYDRODIURIL) 25 mg tablet Take 1 Tab by mouth daily. 90 Tab 1    b complex vitamins tablet Take 1 Tab by mouth daily.       gabapentin (NEURONTIN) 300 mg capsule 1 tablet a day for 1 day, then  1 tablet twice a day for 1 day, then  1 tablet three times a day 30 Cap 1       Allergies   Allergen Reactions    Codeine Hives    Indomethacin Palpitations    Naproxen Sodium Other (comments)     \"feel bad all over\"    Sulfa (Sulfonamide Antibiotics) Unknown (comments)    Tramadol Nausea Only         Objective:    Visit Vitals  BP 116/82 (BP 1 Location: Left arm, BP Patient Position: Sitting)   Pulse 82   Resp 14   Ht 5' 5\" (1.651 m)   Wt 93 kg (205 lb)   LMP 01/20/2016   SpO2 100%   BMI 34.11 kg/m²       Tere Eason is a 54 y.o. female who appears well and in no distress. She is awake, alert, and oriented. A limited skin examination was completed including the left upper back. There is an at least 7 cm rubbery subcutaneous nodule on the left upper back most consistent with lipoma. Assessment/Plan:  Lipoma, left upper back. Patient is reassured and discussed removal as an option to see if her pain improves. See Dr. Alton Salvador note for procedural excision done today. This plan was reviewed with the patient and patient agrees. All questions were answered. This scribe documentation was reviewed by me and accurately reflects the examination and decisions made by me.

## 2020-01-20 NOTE — PATIENT INSTRUCTIONS
WOUND CARE INSTRUCTIONS 1. Keep the dressing clean and dry and do not remove for 48 hours. 2. Then change the dressing once a day as follows: 
a. Wash hands before and after each dressing change. b. Remove dressing and wash site gently with mild soap and water, rinse, and pat dry. 
c. Apply liberal amounts of an ointment (Petroleum jelly or Aquaphor). d. Apply a non-stick (Telfa) dressing or Band-Aid to cover the wound. 3. Watch for: BLEEDING: A small amount of drainage may occur. If bleeding occurs, elevate and rest the surgery site. Apply gauze and steady pressure for 20 minutes. If bleeding continues repeat pressure once more. If bleeding still does not stop, call this office. If after hours, call Dr. Parveen Sotelo at [phone]. INFECTION: Signs of infection include increased redness, pain, warmth, drainage of pus, and fever. If this occurs, please call this office. 4. Special Instructions (follow any that are checked): 
· [x] You have stitches that DO need to be removed. · [x] Avoid bending at the waist and heavy lifting for two days. · [] Sleep with your head elevated for the next two nights. · [x] Rest the surgery site and keep it elevated as much as possible for two days. · [] You may apply an ice-pack for 10-15 minutes every waking hour for the rest of the day. · [] Eat a soft diet and avoid hot food and hot drinks for the rest of the day. · [] Other instructions: Follow up as directed. Take Tylenol or Ibuprofen for pain as needed. Once the site is healed with no remaining bandages or open areas, protect your surgical site and scar from the sun, as this area will be more sensitive. Use a broad spectrum sunscreen SPF 30 or higher daily, and a chemical free product (one containing zinc oxide or titanium dioxide) is a good choice if the area is sensitive.  
 
You may begin to gently massage the surgical site in 3 weeks, rubbing in a circular motion along the scar. This can help reduce swelling and thickness of a scar. If you have any questions or concerns, please call our office Monday through Friday at [phone]. If after hours, please call Dr. Luiz Higginbotham at [phone].

## 2020-01-20 NOTE — PATIENT INSTRUCTIONS
Self Skin Exam and Sunscreens Early detection and treatment is essential in the treatment of all forms of skin cancer. If caught early, all forms of skin cancer are curable. In addition to your regular visits, you should perform a monthly skin examination. Over time, you become familiar with what is normally found on your skin and can identify new or suspicious spots. One of the screening tools you can use to assess your skin is to follow the ABCDEs: 
 
A= Asymmetry (One half is unlike the other half) B= Border (An irregular, scalloped or poorly defined edge) C= Color (Is varied from one area to another, has shades of tan, brown/ black, white, red or blue) D= Diameter (Spots larger than 6mm or a pencil eraser) E= Evolving (New spots or one that is changing in size, shape, or color) A follow- up interval will be customized based on your history of skin cancer or level of skin damage and risk factors. In any case, if you notice a suspicious or new spot, an appointment should be arranged between regular visits. Everyone should use sunscreen and sun-safe practices, which is especially important for those with a personal or family history of skin cancer. Suggestions for this include: 1. Use daily moisturizers containing SPF 30 or higher. 2. Wear long sleeve clothing with UPF ratings and a broad-brimmed hat. 3. Apply sunscreen with SPF 30 or higher to all sun exposed areas if you are going to be in the sun. A broad spectrum UVA/ UVB sunscreen is best.  Dont forget to REAPPLY every two hours or more often if swimming or sweating! 4. Avoid outside activities during peak sun hours, especially in the summer (10am- 2pm). 5. DO NOT use tanning beds. Using sunscreen and sun-safe practices can help reduce the likelihood of developing skin cancer or additional skin cancers in those previously diagnosed.

## 2020-01-22 NOTE — PROGRESS NOTES
Please let the patient know that the pathology from her excision has returned and shows a lipoma, which was the expected result. No further treatment is needed at this time. Thank you.

## 2020-01-24 NOTE — PROGRESS NOTES
I left vm for patient letting her know about pathology results and office number should she have any questions.

## 2020-02-04 ENCOUNTER — TELEPHONE (OUTPATIENT)
Dept: FAMILY MEDICINE CLINIC | Age: 50
End: 2020-02-04

## 2020-02-04 DIAGNOSIS — E55.9 VITAMIN D DEFICIENCY: Primary | ICD-10-CM

## 2020-02-04 RX ORDER — ERGOCALCIFEROL 1.25 MG/1
50000 CAPSULE ORAL
Qty: 12 CAP | Refills: 0 | Status: SHIPPED | OUTPATIENT
Start: 2020-02-04

## 2020-02-04 NOTE — TELEPHONE ENCOUNTER
I attempted to call patient several times regarding her low vitamin d and supplementation for this. She has also missed her follow up clinic appointment. Please call patient to let her know that her Vitamin D is low, and I have sent a supplement to her pharmacy. We will plan to recheck this in 3 months.      Ld Weiner MD

## 2020-02-07 ENCOUNTER — OFFICE VISIT (OUTPATIENT)
Dept: DERMATOLOGY | Facility: AMBULATORY SURGERY CENTER | Age: 50
End: 2020-02-07

## 2020-02-07 DIAGNOSIS — D17.1 LIPOMA OF BACK: Primary | ICD-10-CM

## 2020-02-07 NOTE — PROGRESS NOTES
Bebe Connors, 52 y.o., female      Patient presents for suture removal from back r/t excision. Skin Sutures removed by JOSEPH Salguero     Skin is appropriate for race. Skin is intact. Skin does not show signs of infection. Wound care and activity instructions given. Patient expresses understanding. Patient to follow up in as needed.

## 2020-02-07 NOTE — PROGRESS NOTES
Wound check/suture removal:    Chief complaint: wound check. HPI: Stephanie Carbajal presents for wound check following surgical excision of a lipoma on the left upper back performed about 2 weeks ago. Exam: The surgical site was examined. There is not evidence of infection. There is erythema. There is not edema. A/P:  Wound check. The surgical site is healing well. Additional care was reviewed including liberal application of Vaseline several times daily and gentle scar massage starting at 3 weeks postop. Follow up will be as needed.

## 2020-05-07 ENCOUNTER — OFFICE VISIT (OUTPATIENT)
Dept: PRIMARY CARE CLINIC | Age: 50
End: 2020-05-07

## 2020-05-07 VITALS — HEART RATE: 87 BPM | RESPIRATION RATE: 16 BRPM | TEMPERATURE: 99.8 F | OXYGEN SATURATION: 98 %

## 2020-05-07 DIAGNOSIS — R50.9 FEVER AND CHILLS: Primary | ICD-10-CM

## 2020-05-07 RX ORDER — AZITHROMYCIN 250 MG/1
TABLET, FILM COATED ORAL
Qty: 6 TAB | Refills: 0 | Status: SHIPPED | OUTPATIENT
Start: 2020-05-07 | End: 2020-05-12

## 2020-05-08 LAB — SARS-COV-2, NAA: NOT DETECTED

## 2021-03-02 DIAGNOSIS — R60.9 FLUID RETENTION: ICD-10-CM

## 2021-03-02 RX ORDER — HYDROCHLOROTHIAZIDE 25 MG/1
25 TABLET ORAL DAILY
Qty: 14 TAB | Refills: 0 | Status: SHIPPED | OUTPATIENT
Start: 2021-03-02 | End: 2021-04-05 | Stop reason: SDUPTHER

## 2021-03-02 NOTE — TELEPHONE ENCOUNTER
MD Ketan Martinez,    Patient call requesting refill of hctz. Appt due! Thanks, Sonia Siddiqui    Last Visit: 1/2/20 MD Ketan Martinez  Next Appointment: Not rescheduled- provider cancelled 9/18/20  Previous Refill Encounter(s): 1/2/20 90 = 1    Requested Prescriptions     Pending Prescriptions Disp Refills    hydroCHLOROthiazide (HYDRODIURIL) 25 mg tablet 90 Tab 0     Sig: Take 1 Tab by mouth daily. Appointment due!

## 2021-04-05 DIAGNOSIS — R60.9 FLUID RETENTION: ICD-10-CM

## 2021-04-05 RX ORDER — HYDROCHLOROTHIAZIDE 25 MG/1
25 TABLET ORAL DAILY
Qty: 30 TAB | Refills: 0 | Status: SHIPPED | OUTPATIENT
Start: 2021-04-05 | End: 2021-04-28 | Stop reason: SDUPTHER

## 2021-04-05 NOTE — TELEPHONE ENCOUNTER
Patient requesting refill      . Requested Prescriptions     Pending Prescriptions Disp Refills    hydroCHLOROthiazide (HYDRODIURIL) 25 mg tablet 14 Tab 0     Sig: Take 1 Tab by mouth daily. Please schedule office apt for additional refills.            BCB#[phone]    UOV 04/30/2021 9:15AM Friday

## 2021-04-05 NOTE — TELEPHONE ENCOUNTER
Pt has an appointment scheduled for 04/30/2021 with Dr. Penelope Moss. Can pt have a refill until upcoming appointment? Last visit 01/20/2020 MD Penelope Moss   Next appointment 04/30/2021 MD Penelope Moss   Previous refill encounter(s)   03/02/2021 HCTZ #[id number]     Requested Prescriptions     Pending Prescriptions Disp Refills    hydroCHLOROthiazide (HYDRODIURIL) 25 mg tablet 30 Tab 0     Sig: Take 1 Tab by mouth daily. Please schedule office apt for additional refills.

## 2021-04-28 DIAGNOSIS — R60.9 FLUID RETENTION: ICD-10-CM

## 2021-04-28 RX ORDER — HYDROCHLOROTHIAZIDE 25 MG/1
25 TABLET ORAL DAILY
Qty: 90 TAB | Refills: 1 | Status: SHIPPED | OUTPATIENT
Start: 2021-04-28

## 2021-04-28 NOTE — TELEPHONE ENCOUNTER
MD Jamie Cabrera for refill. Should have enough til 5/4/21 if you want to hold until visit. Thanks, Blanca Minor    Last Visit: 1/2/20 MD Rachel Tillman  Next Appointment: 4/30/21 MD Rachel Tillman  Previous Refill Encounter(s): 4/5/21 30    Requested Prescriptions     Pending Prescriptions Disp Refills    hydroCHLOROthiazide (HYDRODIURIL) 25 mg tablet 90 Tab 1     Sig: Take 1 Tab by mouth daily.

## 2021-06-05 ENCOUNTER — TELEPHONE (OUTPATIENT)
Dept: FAMILY MEDICINE CLINIC | Age: 51
End: 2021-06-05

## 2021-06-05 DIAGNOSIS — G43.009 MIGRAINE WITHOUT AURA AND WITHOUT STATUS MIGRAINOSUS, NOT INTRACTABLE: Primary | ICD-10-CM

## 2021-06-05 RX ORDER — SUMATRIPTAN 50 MG/1
TABLET, FILM COATED ORAL
Qty: 2 TABLET | Refills: 0 | Status: SHIPPED | OUTPATIENT
Start: 2021-06-05

## 2021-06-05 NOTE — TELEPHONE ENCOUNTER
Called on call provider with complaints of migraine headache. Onset was 6 hours ago, no relief since. Longstanding history of migraine headaches, worsening in severity over the past year. Headache is similar in nature to previous migraines. Bilateral frontal headache, throbbing, moderate to severe in intensity. Associated photophobia, nausea. High stress recently, no changes in diet or sleep. Routinely exercising, eating well. No caffeine use, no ETOH or drug use. Treatments included ibuprofen and then [facility] power with little relief, rest and turning the lights off with little relief. Denies vision changes, dizziness, speech changes, weakness, numbness, paresthesias. She does not have a BP cuff at home but periodically monitors BP which is \"always normal\" and taking her HCTZ as prescribed. Recommendations: Continued rest, turning the lights off, cool compress to head, small cup of caffeinated tea or coffee. Trial Imitrex tonight. If no relief NP advised she seek care at urgent care for additional evaluation. Advised routine follow-up with PCP. Discussed red flags including worsening symptoms or new symptoms that would prompt urgent medical evaluation.  Patient is agreeable and states understanding

## 2021-06-16 ENCOUNTER — OFFICE VISIT (OUTPATIENT)
Dept: FAMILY MEDICINE CLINIC | Age: 51
End: 2021-06-16
Payer: COMMERCIAL

## 2021-06-16 VITALS
TEMPERATURE: 98.8 F | DIASTOLIC BLOOD PRESSURE: 78 MMHG | WEIGHT: 169 LBS | HEART RATE: 71 BPM | SYSTOLIC BLOOD PRESSURE: 116 MMHG | BODY MASS INDEX: 28.16 KG/M2 | OXYGEN SATURATION: 98 % | HEIGHT: 65 IN | RESPIRATION RATE: 18 BRPM

## 2021-06-16 DIAGNOSIS — R51.9 NONINTRACTABLE EPISODIC HEADACHE, UNSPECIFIED HEADACHE TYPE: ICD-10-CM

## 2021-06-16 DIAGNOSIS — Z12.11 SCREENING FOR MALIGNANT NEOPLASM OF COLON: ICD-10-CM

## 2021-06-16 DIAGNOSIS — Z00.8 ENCOUNTER FOR BIOMETRIC SCREENING: Primary | ICD-10-CM

## 2021-06-16 DIAGNOSIS — E55.9 VITAMIN D DEFICIENCY: ICD-10-CM

## 2021-06-16 DIAGNOSIS — R53.82 CHRONIC FATIGUE: ICD-10-CM

## 2021-06-16 LAB
ALBUMIN SERPL-MCNC: 3.8 G/DL (ref 3.5–5)
ALBUMIN/GLOB SERPL: 1.3 {RATIO} (ref 1.1–2.2)
ALP SERPL-CCNC: 54 U/L (ref 45–117)
ALT SERPL-CCNC: 35 U/L (ref 12–78)
ANION GAP SERPL CALC-SCNC: 4 MMOL/L (ref 5–15)
AST SERPL-CCNC: 39 U/L (ref 15–37)
BASOPHILS # BLD: 0.1 K/UL (ref 0–0.1)
BASOPHILS NFR BLD: 1 % (ref 0–1)
BILIRUB SERPL-MCNC: 0.4 MG/DL (ref 0.2–1)
BUN SERPL-MCNC: 15 MG/DL (ref 6–20)
BUN/CREAT SERPL: 18 (ref 12–20)
CALCIUM SERPL-MCNC: 9.6 MG/DL (ref 8.5–10.1)
CHLORIDE SERPL-SCNC: 104 MMOL/L (ref 97–108)
CHOLEST SERPL-MCNC: 187 MG/DL
CO2 SERPL-SCNC: 29 MMOL/L (ref 21–32)
CREAT SERPL-MCNC: 0.82 MG/DL (ref 0.55–1.02)
DIFFERENTIAL METHOD BLD: ABNORMAL
EOSINOPHIL # BLD: 0.1 K/UL (ref 0–0.4)
EOSINOPHIL NFR BLD: 2 % (ref 0–7)
ERYTHROCYTE [DISTWIDTH] IN BLOOD BY AUTOMATED COUNT: 14.8 % (ref 11.5–14.5)
EST. AVERAGE GLUCOSE BLD GHB EST-MCNC: 100 MG/DL
GLOBULIN SER CALC-MCNC: 3 G/DL (ref 2–4)
GLUCOSE SERPL-MCNC: 94 MG/DL (ref 65–100)
HBA1C MFR BLD: 5.1 % (ref 4–5.6)
HCT VFR BLD AUTO: 44.3 % (ref 35–47)
HDLC SERPL-MCNC: 83 MG/DL
HDLC SERPL: 2.3 {RATIO} (ref 0–5)
HGB BLD-MCNC: 14.2 G/DL (ref 11.5–16)
IMM GRANULOCYTES # BLD AUTO: 0 K/UL (ref 0–0.04)
IMM GRANULOCYTES NFR BLD AUTO: 0 % (ref 0–0.5)
LDLC SERPL CALC-MCNC: 90.6 MG/DL (ref 0–100)
LYMPHOCYTES # BLD: 1.4 K/UL (ref 0.8–3.5)
LYMPHOCYTES NFR BLD: 24 % (ref 12–49)
MCH RBC QN AUTO: 27.7 PG (ref 26–34)
MCHC RBC AUTO-ENTMCNC: 32.1 G/DL (ref 30–36.5)
MCV RBC AUTO: 86.4 FL (ref 80–99)
MONOCYTES # BLD: 0.7 K/UL (ref 0–1)
MONOCYTES NFR BLD: 11 % (ref 5–13)
NEUTS SEG # BLD: 3.8 K/UL (ref 1.8–8)
NEUTS SEG NFR BLD: 62 % (ref 32–75)
NRBC # BLD: 0 K/UL (ref 0–0.01)
NRBC BLD-RTO: 0 PER 100 WBC
PLATELET # BLD AUTO: 217 K/UL (ref 150–400)
PMV BLD AUTO: 11.1 FL (ref 8.9–12.9)
POTASSIUM SERPL-SCNC: 4.7 MMOL/L (ref 3.5–5.1)
PROT SERPL-MCNC: 6.8 G/DL (ref 6.4–8.2)
RBC # BLD AUTO: 5.13 M/UL (ref 3.8–5.2)
SODIUM SERPL-SCNC: 137 MMOL/L (ref 136–145)
TRIGL SERPL-MCNC: 67 MG/DL (ref ?–150)
TSH SERPL DL<=0.05 MIU/L-ACNC: 0.54 UIU/ML (ref 0.36–3.74)
VLDLC SERPL CALC-MCNC: 13.4 MG/DL
WBC # BLD AUTO: 6.1 K/UL (ref 3.6–11)

## 2021-06-16 PROCEDURE — 99214 OFFICE O/P EST MOD 30 MIN: CPT | Performed by: FAMILY MEDICINE

## 2021-06-16 RX ORDER — HYDROCHLOROTHIAZIDE 25 MG/1
25 TABLET ORAL
COMMUNITY

## 2021-06-16 NOTE — PROGRESS NOTES
Chief Complaint   Patient presents with    Fatigue    Headache     1. Have you been to the ER, urgent care clinic since your last visit? Hospitalized since your last visit? No    2. Have you seen or consulted any other health care providers outside of the [address] [place] [address] since your last visit? Include any pap smears or colon screening.  No

## 2021-06-16 NOTE — PROGRESS NOTES
[place] [facility] Note    Assessment/ Plan:   Diagnoses and all orders for this visit:    1. Encounter for biometric screening  -     CBC WITH AUTOMATED DIFF; Future  -     METABOLIC PANEL, COMPREHENSIVE; Future  -     LIPID PANEL; Future  -     HEMOGLOBIN A1C WITH EAG; Future    2. Nonintractable episodic headache, unspecified headache type  -     CBC WITH AUTOMATED DIFF; Future  -     METABOLIC PANEL, COMPREHENSIVE; Future  -     TSH 3RD GENERATION; Future  -     REFERRAL TO GASTROENTEROLOGY  -     REFERRAL TO NEUROLOGY    3. Chronic fatigue  -     CBC WITH AUTOMATED DIFF; Future  -     METABOLIC PANEL, COMPREHENSIVE; Future  -     TSH 3RD GENERATION; Future  -     REFERRAL TO GASTROENTEROLOGY  -     REFERRAL TO NEUROLOGY  -     VITAMIN D, 25 HYDROXY; Future    4. Vitamin D deficiency  -     VITAMIN D, 25 HYDROXY; Future    5. Screening for malignant neoplasm of colon  -     REFERRAL TO GASTROENTEROLOGY      Recommendations based on history, physical exam and review of pertinent labs, studies and medications:   Biometric screening completed. Chronic headache and fatigue. Referral to neurologist for evaluation. Labs to evaluate etiology. Screening colonoscopy ordered. Labs to monitor endorgan function chronic conditions. Diet and lifestyle modification encouraged for weight loss and chronic disease prevention/ management. Follow up with specialists per routine. Educated patient on red flag symptoms to warrant return to clinic or emergency room visit. I have discussed the diagnosis with the patient and the intended plan as seen in the above orders. The patient has been offered or received an after-visit summary and questions were answered concerning future plans. I have discussed medication side effects and warnings with the patient as well. Follow-up and Dispositions    · Return if symptoms worsen or fail to improve.          Subjective:     Chief Complaint Patient presents with    Fatigue    Headache     Nitesh Johnson is a 54y.o. year old female who presents for evaluation of the following:      Headache:   Chronic intiermittnet  Last occurred 1 week ago, lasting 2 days  Character nausea, fatigue  Frequency: 3 in past 2 months  Possible Triggers: Moderna vacicne  Treatment: Excedrin migraine with no relief then FLORENCIO with improvement   Previous Tx: topamax, gabapentin  Denies injury,stress, vision changes      Fatigue: Onset in 2021  Character: feels tired after work  Sleep 8pm-6am, feels tired  - waked once at night for urination  No change in diet and physical activity regimen  Diet: ariane smith  History of thyroid nodule  Works at a [employer]        Review of Systems   Pertinent positives and negative per HPI. All other systems  reviewed are negative for a Comprehensive ROS (10+). Past medical history, social history, family history reviewed. Medications reconciled. Objective:     Vitals:    06/16/21 1034   BP: 116/78   Pulse: 71   Resp: 18   Temp: 98.8 °F (37.1 °C)   TempSrc: Temporal   SpO2: 98%   Weight: 169 lb (76.7 kg)   Height: 5' 5\" (1.651 m)       Physical Examination:  General: Alert, cooperative, no distress, appears stated age. Overweight   Eyes: Conjunctivae clear. Pupils equally round and reactive to light, Extraocular muscles intact. Ears: Normal external ear canals both ears. Tympanic membranes clear and mobile bilaterally   Nose: Nares normal. Septum midline. Mucosa normal. No drainage or sinus tenderness. Mouth/Throat: Lips, mucosa, and tongue normal. No oropharyngeal erythema. No tonsillar enlargement or exudate. Neck: Supple, symmetrical, trachea midline, no adenopathy. No thyroid enlargement/tenderness/nodules  Respiratory: Breathing comfortably, in no acute respiratory distress. Clear to auscultation bilaterally. Normal inspiratory and expiratory ratio.    Cardiovascular: Regular rate and rhythm, S1, S2 normal, no murmur, click, rub or gallop.   -Extremities no edema. Pulses 2+ and symmetric radial and dorsalis pedis   Abdomen: Soft, non-tender, not distended. Bowel sounds normal. No masses or organomegaly. Waist circumference 31.75in  MSK: Extremities normal appearing, atraumatic, no effusion. Gait steady and unassisted. Skin: Skin color, texture, turgor normal. No rashes or lesions on exposed skin. Lymph nodes: Cervical, supraclavicular nodes normal.  Neurologic: Cranial nerves II-XII intact. Strength 5/5 grossly. Sensation and reflexes normal throughout. Psychiatric: Affect appropriate. Mood euthymic.  Thoughts logical. Speech volume and speed normal      Signed,    Chris Ruelas MD  6/16/2021

## 2021-06-24 NOTE — PROGRESS NOTES
Notify Patient:  All of your results look good. There are no significant abnormalities. You may return to clinic for a lab appointment to check your vitamin D level.

## 2021-06-24 NOTE — PATIENT INSTRUCTIONS
A Healthy Lifestyle: Care Instructions  Your Care Instructions     A healthy lifestyle can help you feel good, stay at a healthy weight, and have plenty of energy for both work and play. A healthy lifestyle is something you can share with your whole family. A healthy lifestyle also can lower your risk for serious health problems, such as high blood pressure, heart disease, and diabetes. You can follow a few steps listed below to improve your health and the health of your family. Follow-up care is a key part of your treatment and safety. Be sure to make and go to all appointments, and call your doctor if you are having problems. It's also a good idea to know your test results and keep a list of the medicines you take. How can you care for yourself at home? · Do not eat too much sugar, fat, or fast foods. You can still have dessert and treats now and then. The goal is moderation. · Start small to improve your eating habits. Pay attention to portion sizes, drink less juice and soda pop, and eat more fruits and vegetables. ? Eat a healthy amount of food. A 3-ounce serving of meat, for example, is about the size of a deck of cards. Fill the rest of your plate with vegetables and whole grains. ? Limit the amount of soda and sports drinks you have every day. Drink more water when you are thirsty. ? Eat plenty of fruits and vegetables every day. Have an apple or some carrot sticks as an afternoon snack instead of a candy bar. Try to have fruits and/or vegetables at every meal.  · Make exercise part of your daily routine. You may want to start with simple activities, such as walking, bicycling, or slow swimming. Try to be active 30 to 60 minutes every day. You do not need to do all 30 to 60 minutes all at once. For example, you can exercise 3 times a day for 10 or 20 minutes.  Moderate exercise is safe for most people, but it is always a good idea to talk to your doctor before starting an exercise program.  · Keep moving. Kwame Shank the lawn, work in the garden, or [employer]. Take the stairs instead of the elevator at work. · If you smoke, quit. People who smoke have an increased risk for heart attack, stroke, cancer, and other lung illnesses. Quitting is hard, but there are ways to boost your chance of quitting tobacco for good. ? Use nicotine gum, patches, or lozenges. ? Ask your doctor about stop-smoking programs and medicines. ? Keep trying. In addition to reducing your risk of diseases in the future, you will notice some benefits soon after you stop using tobacco. If you have shortness of breath or asthma symptoms, they will likely get better within a few weeks after you quit. · Limit how much alcohol you drink. Moderate amounts of alcohol (up to 2 drinks a day for men, 1 drink a day for women) are okay. But drinking too much can lead to liver problems, high blood pressure, and other health problems. Family health  If you have a family, there are many things you can do together to improve your health. · Eat meals together as a family as often as possible. · Eat healthy foods. This includes fruits, vegetables, lean meats and dairy, and whole grains. · Include your family in your fitness plan. Most people think of activities such as jogging or tennis as the way to fitness, but there are many ways you and your family can be more active. Anything that makes you breathe hard and gets your heart pumping is exercise. Here are some tips:  ? Walk to do errands or to take your child to school or the bus.  ? Go for a family bike ride after dinner instead of watching TV. Where can you learn more? Go to http://www.gray.com/  Enter H273 in the search box to learn more about \"A Healthy Lifestyle: Care Instructions. \"  Current as of: September 23, 2020               Content Version: 12.8  © 8658-7787 [employer].    Care instructions adapted under license by [employer] (which disclaims liability or warranty for this information). If you have questions about a medical condition or this instruction, always ask your healthcare professional. [address] any warranty or liability for your use of this information.

## 2021-06-24 NOTE — PROGRESS NOTES
Patient 2 identifiers confirmed. Patient given lab results as reviewed by Dr. Sarahi Chavez, informed to complete Vitamin D levels. Patient was scheduled for 6/24/2021 at 3:30 pm and letter sent.   Rich Triplett LPN

## 2022-03-20 PROBLEM — E55.9 VITAMIN D DEFICIENCY: Status: ACTIVE | Noted: 2020-02-04

## 2023-05-20 RX ORDER — HYDROCHLOROTHIAZIDE 25 MG/1
25 TABLET ORAL
COMMUNITY
Start: 2021-04-28

## 2023-05-20 RX ORDER — SUMATRIPTAN 50 MG/1
TABLET, FILM COATED ORAL
COMMUNITY
Start: 2021-06-05

## 2023-05-20 RX ORDER — ERGOCALCIFEROL 1.25 MG/1
50000 CAPSULE ORAL
COMMUNITY
Start: 2020-02-04